# Patient Record
Sex: FEMALE | Race: ASIAN | NOT HISPANIC OR LATINO | URBAN - METROPOLITAN AREA
[De-identification: names, ages, dates, MRNs, and addresses within clinical notes are randomized per-mention and may not be internally consistent; named-entity substitution may affect disease eponyms.]

---

## 2023-09-20 VITALS — HEIGHT: 62 IN | WEIGHT: 125 LBS

## 2023-09-20 RX ORDER — CHLORHEXIDINE GLUCONATE 213 G/1000ML
1 SOLUTION TOPICAL ONCE
Refills: 0 | Status: DISCONTINUED | OUTPATIENT
Start: 2023-09-25 | End: 2023-10-10

## 2023-09-20 NOTE — H&P ADULT - NSICDXPASTMEDICALHX_GEN_ALL_CORE_FT
PAST MEDICAL HISTORY:  CAD (coronary artery disease)     History of varicose veins     HLD (hyperlipidemia)     HTN (hypertension)     Pulmonary nodules     Type 2 diabetes mellitus

## 2023-09-20 NOTE — H&P ADULT - HISTORY OF PRESENT ILLNESS
Cardiologist: Dr. Rigo Taylor   Pharmacy:   Escort:     Confirm meds     68 year old Mandarin speaking F with PMHx HTN, Hyperlipidemia, Non obstructive CAD by diagnostic cardiac cath in [place] in 2002, DM Type II, PVD, Varicose Veins with severe Right GSV reflux s/p Endovenous Thermal Ablation 11/2021, Covid 19 May 2022. Pulmonary Nodules (3 mm Right Lower Lobe and 4 mm Left Upper Lobe Nodule on CT Chest 7/14/23), who presented to cardiologist Dr. Taylor for follow-up after discharge from [facility] ER. Patient presented to [facility] ER c/o worsening C/P and Troponin negative x 2 and EKG showed no significant changes and she was subsequently discharged. She admits to substernal non-radiating intermittent pressure x few weeks that has been gradually worsening when ambulating less than 2 blocks or climbing less than 1 flight of stairs. Patient denies palpitations, dizziness, diaphoresis, N/V, syncope, LE edema, PND, or orthopnea.      Nuclear Stress Test 8/19/23 revealed moderate amount of ischemia in the anterior location, post stress LVEF 77%, normal wall motion, no TID. Echocardiogram 7/21/23 revealed LVEF 57%, normal wall motion, Grade I Diastolic Dysfunction, no significant valvular disease, no pulmonary HTN. In light of patient’s risk factors, CCS Angina Class III Symptoms and abnormal NST patient now presents for cardiac cath with possible intervention if clinically indicated.   Cardiologist: Dr. Rigo Taylor   Pharmacy:   Escort:     Confirm meds     68 year old Mandarin speaking F with PMHx HTN, Hyperlipidemia, Non obstructive CAD by diagnostic cardiac cath in [place] in 2002, DM Type II, PVD, Varicose Veins with severe Right GSV reflux s/p Endovenous Thermal Ablation 11/2021, Covid 19 May 2022. Pulmonary Nodules (3 mm Right Lower Lobe and 4 mm Left Upper Lobe Nodule on CT Chest 7/14/23), who presented to cardiologist Dr. Taylor for follow-up after discharge from [facility] ER. Patient presented to [facility] ER c/o worsening C/P and Troponin negative x 2 and EKG showed no significant changes and she was subsequently discharged. She admits to substernal non-radiating intermittent chest pressure x few weeks that has been gradually worsening when ambulating less than 2 blocks or climbing less than 1 flight of stairs. Patient denies palpitations, dizziness, diaphoresis, N/V, syncope, LE edema, PND, or orthopnea.      Nuclear Stress Test 8/19/23 revealed moderate amount of ischemia in the anterior location, post stress LVEF 77%, normal wall motion, no TID. Echocardiogram 7/21/23 revealed LVEF 57%, normal wall motion, Grade I Diastolic Dysfunction, no significant valvular disease, no pulmonary HTN. In light of patient’s risk factors, CCS Angina Class III Symptoms and abnormal NST patient now presents for cardiac cath with possible intervention if clinically indicated.   Cardiologist: Dr. Rigo Taylor   Pharmacy: [facility] ([phone])   Escort: Son       68 year old Mandarin speaking F, with FMHx of CAD (father had MI in his 60s) with PMHx HTN, Hyperlipidemia, Non obstructive CAD by diagnostic cardiac cath in [place] in 2002, DM Type II, PVD, Varicose Veins with severe Right GSV reflux s/p Endovenous Thermal Ablation 11/2021, Covid 19 May 2022. Pulmonary Nodules (3 mm Right Lower Lobe and 4 mm Left Upper Lobe Nodule on CT Chest 7/14/23), who presented to cardiologist Dr. Taylor for follow-up after discharge from [facility] ER. Patient presented to [facility] ER c/o worsening C/P and Troponin negative x 2 and EKG showed no significant changes and she was subsequently discharged. She admits to substernal non-radiating intermittent chest pressure x few weeks that has been gradually worsening when ambulating less than 2 blocks or climbing less than 1 flight of stairs. Patient denies palpitations, dizziness, diaphoresis, N/V, syncope, LE edema, PND, or orthopnea.      Nuclear Stress Test 8/19/23 revealed moderate amount of ischemia in the anterior location, post stress LVEF 77%, normal wall motion, no TID. Echocardiogram 7/21/23 revealed LVEF 57%, normal wall motion, Grade I Diastolic Dysfunction, no significant valvular disease, no pulmonary HTN. In light of patient’s risk factors, CCS Angina Class III Symptoms and abnormal NST patient now presents for cardiac cath with possible intervention if clinically indicated.

## 2023-09-20 NOTE — H&P ADULT - NSHPLABSRESULTS_GEN_ALL_CORE
13.2   6.24  )-----------( 264      ( 25 Sep 2023 13:17 )             38.9       09-25    141  |  106  |  20  ----------------------------<  111<H>  4.0   |  24  |  0.86    Ca    9.8      25 Sep 2023 13:17  Mg     2.4     09-25    TPro  7.3  /  Alb  4.6  /  TBili  0.4  /  DBili  x   /  AST  16  /  ALT  13  /  AlkPhos  59  09-25      PT/INR - ( 25 Sep 2023 13:17 )   PT: 9.8 sec;   INR: 0.86          PTT - ( 25 Sep 2023 13:17 )  PTT:29.4 sec    CARDIAC MARKERS ( 25 Sep 2023 13:17 )  x     / x     / 71 U/L / x     / 1.3 ng/mL        Urinalysis Basic - ( 25 Sep 2023 13:17 )    Color: x / Appearance: x / SG: x / pH: x  Gluc: 111 mg/dL / Ketone: x  / Bili: x / Urobili: x   Blood: x / Protein: x / Nitrite: x   Leuk Esterase: x / RBC: x / WBC x   Sq Epi: x / Non Sq Epi: x / Bacteria: x        EKG: 13.2   6.24  )-----------( 264      ( 25 Sep 2023 13:17 )             38.9       09-25    141  |  106  |  20  ----------------------------<  111<H>  4.0   |  24  |  0.86    Ca    9.8      25 Sep 2023 13:17  Mg     2.4     09-25    TPro  7.3  /  Alb  4.6  /  TBili  0.4  /  DBili  x   /  AST  16  /  ALT  13  /  AlkPhos  59  09-25      PT/INR - ( 25 Sep 2023 13:17 )   PT: 9.8 sec;   INR: 0.86          PTT - ( 25 Sep 2023 13:17 )  PTT:29.4 sec    CARDIAC MARKERS ( 25 Sep 2023 13:17 )  x     / x     / 71 U/L / x     / 1.3 ng/mL        Urinalysis Basic - ( 25 Sep 2023 13:17 )    Color: x / Appearance: x / SG: x / pH: x  Gluc: 111 mg/dL / Ketone: x  / Bili: x / Urobili: x   Blood: x / Protein: x / Nitrite: x   Leuk Esterase: x / RBC: x / WBC x   Sq Epi: x / Non Sq Epi: x / Bacteria: x        EKG from outpatient 9/15: NSR 90 bpm with nonspecific ST abnormalities.

## 2023-09-20 NOTE — H&P ADULT - ASSESSMENT
68 year old Mandarin speaking F, with FMHx of CAD (father had MI in his 60s) with PMHx HTN, Hyperlipidemia, Non obstructive CAD by diagnostic cardiac cath in [place] in 2002, DM Type II, PVD, Varicose Veins with severe Right GSV reflux s/p Endovenous Thermal Ablation 11/2021, Covid 19 May 2022. Pulmonary Nodules (3 mm Right Lower Lobe and 4 mm Left Upper Lobe Nodule on CT Chest 7/14/23), who in light of risk factors, CCS Angina Class III Symptoms and abnormal NST now presents for cardiac cath with possible intervention if clinically indicated.      EKG: 			  ASA II	Mallampati class: I    Anginal Class: III    -shellfish (Vomiting; Nausea; Diarrhea)  No Known Drug Allergies ***PREMEDICATE???**     -H/H = 13.2/38.9  . Pt denies BRBPR, hematuria, hematochezia, melena. Pt endorses compliance w/ home Aspirin and Plavix, stating last dose was 9/25/23 for aspirin and 9/24/23 for plavix. Pt loaded w/ Plavix 75 mg x1  -BUN/Cr =   -EF 57% by echo. Euvolemic on exam. IV NS @ 250cc bolus followed by 75cc/hr x 12 hrs started pre procedure    Sedation Plan:   Moderate  Patient Is Suitable Candidate For Sedation?     Yes    Risks & benefits of procedure and alternative therapy have been explained to the patient including but not limited to: allergic reaction, bleeding with possible need for blood transfusion, infection, renal and vascular compromise, limb damage, arrhythmia, stroke, vessel dissection/perforation, myocardial infarction, and emergent CABG. Informed consent obtained at bedside and included in chart. 68 year old Mandarin speaking F, with FMHx of CAD (father had MI in his 60s) with PMHx HTN, Hyperlipidemia, Non obstructive CAD by diagnostic cardiac cath in [place] in 2002, DM Type II, PVD, Varicose Veins with severe Right GSV reflux s/p Endovenous Thermal Ablation 11/2021, Covid 19 May 2022. Pulmonary Nodules (3 mm Right Lower Lobe and 4 mm Left Upper Lobe Nodule on CT Chest 7/14/23), who in light of risk factors, CCS Angina Class III Symptoms and abnormal NST now presents for cardiac cath with possible intervention if clinically indicated.      EKG: 			  ASA II	Mallampati class: I    Anginal Class: III    -Patient has known shellfish allergy associated with nausea vomiting and diarrhea. IC Fellow contacted multiple times to discuss possible medication pre-procedure. PT was taken back to cath lab before Fellow reached back. PA Informed lab nurse to discussed premedication with IC Fellow in the room with 50mg IVP Benadryl.   -H/H = 13.2/38.9  -Pt denies BRBPR, hematuria, hematochezia, melena. Pt endorses compliance w/ home Aspirin and Plavix, stating last dose was 9/25/23 for aspirin and 9/24/23 for plavix. Pt loaded w/ Plavix 75 mg x1  -BUN/Cr = 20/0.86   -EF 57% by echo. Euvolemic on exam. IV NS @ 250cc bolus followed by 75cc/hr x 12 hrs started pre procedure    Sedation Plan:   Moderate  Patient Is Suitable Candidate For Sedation?     Yes    Risks & benefits of procedure and alternative therapy have been explained to the patient including but not limited to: allergic reaction, bleeding with possible need for blood transfusion, infection, renal and vascular compromise, limb damage, arrhythmia, stroke, vessel dissection/perforation, myocardial infarction, and emergent CABG. Informed consent obtained at bedside and included in chart.

## 2023-09-20 NOTE — H&P ADULT - NSICDXFAMILYHX_GEN_ALL_CORE_FT
FAMILY HISTORY:  Father  Still living? Unknown  FH: MI (myocardial infarction), Age [age] diagnosis: 61-70

## 2023-09-25 ENCOUNTER — OUTPATIENT (OUTPATIENT)
Dept: OUTPATIENT SERVICES | Facility: HOSPITAL | Age: 68
LOS: 1 days | Discharge: HOME | End: 2023-09-25
Payer: MEDICARE

## 2023-09-25 LAB
A1C WITH ESTIMATED AVERAGE GLUCOSE RESULT: 6.6 % — HIGH (ref 4–5.6)
ALBUMIN SERPL ELPH-MCNC: 4.6 G/DL — SIGNIFICANT CHANGE UP (ref 3.3–5)
ALP SERPL-CCNC: 59 U/L — SIGNIFICANT CHANGE UP (ref 40–120)
ALT FLD-CCNC: 13 U/L — SIGNIFICANT CHANGE UP (ref 10–45)
ANION GAP SERPL CALC-SCNC: 11 MMOL/L — SIGNIFICANT CHANGE UP (ref 5–17)
APTT BLD: 29.4 SEC — SIGNIFICANT CHANGE UP (ref 24.5–35.6)
AST SERPL-CCNC: 16 U/L — SIGNIFICANT CHANGE UP (ref 10–40)
BASOPHILS # BLD AUTO: 0.04 K/UL — SIGNIFICANT CHANGE UP (ref 0–0.2)
BASOPHILS NFR BLD AUTO: 0.6 % — SIGNIFICANT CHANGE UP (ref 0–2)
BILIRUB SERPL-MCNC: 0.4 MG/DL — SIGNIFICANT CHANGE UP (ref 0.2–1.2)
BUN SERPL-MCNC: 20 MG/DL — SIGNIFICANT CHANGE UP (ref 7–23)
CALCIUM SERPL-MCNC: 9.8 MG/DL — SIGNIFICANT CHANGE UP (ref 8.4–10.5)
CHLORIDE SERPL-SCNC: 106 MMOL/L — SIGNIFICANT CHANGE UP (ref 96–108)
CHOLEST SERPL-MCNC: 158 MG/DL — SIGNIFICANT CHANGE UP
CK MB CFR SERPL CALC: 1.3 NG/ML — SIGNIFICANT CHANGE UP (ref 0–6.7)
CK SERPL-CCNC: 71 U/L — SIGNIFICANT CHANGE UP (ref 25–170)
CO2 SERPL-SCNC: 24 MMOL/L — SIGNIFICANT CHANGE UP (ref 22–31)
CREAT SERPL-MCNC: 0.86 MG/DL — SIGNIFICANT CHANGE UP (ref 0.5–1.3)
EGFR: 73 ML/MIN/1.73M2 — SIGNIFICANT CHANGE UP
EOSINOPHIL # BLD AUTO: 0.05 K/UL — SIGNIFICANT CHANGE UP (ref 0–0.5)
EOSINOPHIL NFR BLD AUTO: 0.8 % — SIGNIFICANT CHANGE UP (ref 0–6)
ESTIMATED AVERAGE GLUCOSE: 143 MG/DL — HIGH (ref 68–114)
GLUCOSE SERPL-MCNC: 111 MG/DL — HIGH (ref 70–99)
HCT VFR BLD CALC: 38.9 % — SIGNIFICANT CHANGE UP (ref 34.5–45)
HDLC SERPL-MCNC: 56 MG/DL — SIGNIFICANT CHANGE UP
HGB BLD-MCNC: 13.2 G/DL — SIGNIFICANT CHANGE UP (ref 11.5–15.5)
IMM GRANULOCYTES NFR BLD AUTO: 0.2 % — SIGNIFICANT CHANGE UP (ref 0–0.9)
INR BLD: 0.86 — SIGNIFICANT CHANGE UP (ref 0.85–1.18)
LIPID PNL WITH DIRECT LDL SERPL: 44 MG/DL — SIGNIFICANT CHANGE UP
LYMPHOCYTES # BLD AUTO: 1.91 K/UL — SIGNIFICANT CHANGE UP (ref 1–3.3)
LYMPHOCYTES # BLD AUTO: 30.6 % — SIGNIFICANT CHANGE UP (ref 13–44)
MAGNESIUM SERPL-MCNC: 2.4 MG/DL — SIGNIFICANT CHANGE UP (ref 1.6–2.6)
MCHC RBC-ENTMCNC: 30.6 PG — SIGNIFICANT CHANGE UP (ref 27–34)
MCHC RBC-ENTMCNC: 33.9 GM/DL — SIGNIFICANT CHANGE UP (ref 32–36)
MCV RBC AUTO: 90 FL — SIGNIFICANT CHANGE UP (ref 80–100)
MONOCYTES # BLD AUTO: 0.48 K/UL — SIGNIFICANT CHANGE UP (ref 0–0.9)
MONOCYTES NFR BLD AUTO: 7.7 % — SIGNIFICANT CHANGE UP (ref 2–14)
NEUTROPHILS # BLD AUTO: 3.75 K/UL — SIGNIFICANT CHANGE UP (ref 1.8–7.4)
NEUTROPHILS NFR BLD AUTO: 60.1 % — SIGNIFICANT CHANGE UP (ref 43–77)
NON HDL CHOLESTEROL: 102 MG/DL — SIGNIFICANT CHANGE UP
NRBC # BLD: 0 /100 WBCS — SIGNIFICANT CHANGE UP (ref 0–0)
PLATELET # BLD AUTO: 264 K/UL — SIGNIFICANT CHANGE UP (ref 150–400)
POTASSIUM SERPL-MCNC: 4 MMOL/L — SIGNIFICANT CHANGE UP (ref 3.5–5.3)
POTASSIUM SERPL-SCNC: 4 MMOL/L — SIGNIFICANT CHANGE UP (ref 3.5–5.3)
PROT SERPL-MCNC: 7.3 G/DL — SIGNIFICANT CHANGE UP (ref 6–8.3)
PROTHROM AB SERPL-ACNC: 9.8 SEC — SIGNIFICANT CHANGE UP (ref 9.5–13)
RBC # BLD: 4.32 M/UL — SIGNIFICANT CHANGE UP (ref 3.8–5.2)
RBC # FLD: 12.9 % — SIGNIFICANT CHANGE UP (ref 10.3–14.5)
SODIUM SERPL-SCNC: 141 MMOL/L — SIGNIFICANT CHANGE UP (ref 135–145)
TRIGL SERPL-MCNC: 290 MG/DL — HIGH
WBC # BLD: 6.24 K/UL — SIGNIFICANT CHANGE UP (ref 3.8–10.5)
WBC # FLD AUTO: 6.24 K/UL — SIGNIFICANT CHANGE UP (ref 3.8–10.5)

## 2023-09-25 PROCEDURE — 83036 HEMOGLOBIN GLYCOSYLATED A1C: CPT

## 2023-09-25 PROCEDURE — 85610 PROTHROMBIN TIME: CPT

## 2023-09-25 PROCEDURE — C1769: CPT

## 2023-09-25 PROCEDURE — 93458 L HRT ARTERY/VENTRICLE ANGIO: CPT

## 2023-09-25 PROCEDURE — C1887: CPT

## 2023-09-25 PROCEDURE — 85025 COMPLETE CBC W/AUTO DIFF WBC: CPT

## 2023-09-25 PROCEDURE — 83735 ASSAY OF MAGNESIUM: CPT

## 2023-09-25 PROCEDURE — 80061 LIPID PANEL: CPT

## 2023-09-25 PROCEDURE — 80053 COMPREHEN METABOLIC PANEL: CPT

## 2023-09-25 PROCEDURE — 99152 MOD SED SAME PHYS/QHP 5/>YRS: CPT

## 2023-09-25 PROCEDURE — 82553 CREATINE MB FRACTION: CPT

## 2023-09-25 PROCEDURE — C1894: CPT

## 2023-09-25 PROCEDURE — 93458 L HRT ARTERY/VENTRICLE ANGIO: CPT | Mod: 26

## 2023-09-25 PROCEDURE — 82550 ASSAY OF CK (CPK): CPT

## 2023-09-25 PROCEDURE — 85730 THROMBOPLASTIN TIME PARTIAL: CPT

## 2023-09-25 RX ORDER — ASPIRIN/CALCIUM CARB/MAGNESIUM 324 MG
1 TABLET ORAL
Refills: 0 | DISCHARGE

## 2023-09-25 RX ORDER — MAGNESIUM OXIDE 400 MG ORAL TABLET 241.3 MG
1 TABLET ORAL
Refills: 0 | DISCHARGE

## 2023-09-25 RX ORDER — METOPROLOL TARTRATE 50 MG
1 TABLET ORAL
Refills: 0 | DISCHARGE

## 2023-09-25 RX ORDER — NITROGLYCERIN 6.5 MG
1 CAPSULE, EXTENDED RELEASE ORAL
Refills: 0 | DISCHARGE

## 2023-09-25 RX ORDER — CLOPIDOGREL BISULFATE 75 MG/1
75 TABLET, FILM COATED ORAL DAILY
Refills: 0 | Status: DISCONTINUED | OUTPATIENT
Start: 2023-09-25 | End: 2023-10-10

## 2023-09-25 RX ORDER — ICOSAPENT ETHYL 500 MG/1
2 CAPSULE, LIQUID FILLED ORAL
Refills: 0 | DISCHARGE

## 2023-09-25 RX ORDER — SODIUM CHLORIDE 9 MG/ML
500 INJECTION INTRAMUSCULAR; INTRAVENOUS; SUBCUTANEOUS
Refills: 0 | Status: DISCONTINUED | OUTPATIENT
Start: 2023-09-25 | End: 2023-10-10

## 2023-09-25 RX ORDER — CLOPIDOGREL BISULFATE 75 MG/1
1 TABLET, FILM COATED ORAL
Refills: 0 | DISCHARGE

## 2023-09-25 RX ORDER — PITAVASTATIN CALCIUM 1.04 MG/1
1 TABLET, FILM COATED ORAL
Refills: 0 | DISCHARGE

## 2023-09-25 RX ORDER — SODIUM CHLORIDE 9 MG/ML
500 INJECTION INTRAMUSCULAR; INTRAVENOUS; SUBCUTANEOUS
Refills: 0 | Status: DISCONTINUED | OUTPATIENT
Start: 2023-09-25 | End: 2023-09-25

## 2023-09-25 RX ORDER — METFORMIN HYDROCHLORIDE 850 MG/1
1 TABLET ORAL
Refills: 0 | DISCHARGE

## 2023-09-25 RX ORDER — SODIUM CHLORIDE 9 MG/ML
250 INJECTION INTRAMUSCULAR; INTRAVENOUS; SUBCUTANEOUS ONCE
Refills: 0 | Status: COMPLETED | OUTPATIENT
Start: 2023-09-25 | End: 2023-09-25

## 2023-09-25 RX ORDER — RANOLAZINE 500 MG/1
1 TABLET, FILM COATED, EXTENDED RELEASE ORAL
Refills: 0 | DISCHARGE

## 2023-09-25 RX ORDER — BEMPEDOIC ACID 180 MG/1
1 TABLET, FILM COATED ORAL
Refills: 0 | DISCHARGE

## 2023-09-25 RX ADMIN — SODIUM CHLORIDE 75 MILLILITER(S): 9 INJECTION INTRAMUSCULAR; INTRAVENOUS; SUBCUTANEOUS at 14:15

## 2023-09-25 RX ADMIN — CLOPIDOGREL BISULFATE 75 MILLIGRAM(S): 75 TABLET, FILM COATED ORAL at 14:18

## 2023-09-25 RX ADMIN — SODIUM CHLORIDE 500 MILLILITER(S): 9 INJECTION INTRAMUSCULAR; INTRAVENOUS; SUBCUTANEOUS at 14:15

## 2023-09-25 NOTE — PROGRESS NOTE ADULT - SUBJECTIVE AND OBJECTIVE BOX
Interventional Cardiology PA Post PCI SDA Discharge Note      Patient without complaints. Ambulated and voided without difficulties    Afebrile, VSS    Ext:    	Right Radial :  NO hematoma,  NO bleeding, dressing; C/D/I      Pulses:    intact RAD/DP/PT to baseline     A/P:  s/p Diagnostic cath with Dr. Herrera on 9/25/2023 with MLI throughout, EDP 5     1.)	Follow-up with PMD/Cardiologist in 72 hours.  2.) 	Stable for discharge as per attending Dr. herrera after bed rest, pt voids, groin/wrist stable and 30 minutes of ambulation.  	Discharge forms signed and copies in chart

## 2023-10-06 DIAGNOSIS — E78.5 HYPERLIPIDEMIA, UNSPECIFIED: ICD-10-CM

## 2023-10-06 DIAGNOSIS — I25.118 ATHEROSCLEROTIC HEART DISEASE OF NATIVE CORONARY ARTERY WITH OTHER FORMS OF ANGINA PECTORIS: ICD-10-CM

## 2023-10-06 DIAGNOSIS — R94.39 ABNORMAL RESULT OF OTHER CARDIOVASCULAR FUNCTION STUDY: ICD-10-CM

## 2023-10-06 DIAGNOSIS — I10 ESSENTIAL (PRIMARY) HYPERTENSION: ICD-10-CM
